# Patient Record
Sex: MALE | Race: WHITE
[De-identification: names, ages, dates, MRNs, and addresses within clinical notes are randomized per-mention and may not be internally consistent; named-entity substitution may affect disease eponyms.]

---

## 2017-05-04 ENCOUNTER — HOSPITAL ENCOUNTER (OUTPATIENT)
Dept: HOSPITAL 84 - D.RT | Age: 82
Discharge: HOME | End: 2017-05-04
Attending: FAMILY MEDICINE
Payer: MEDICARE

## 2017-05-04 VITALS — BODY MASS INDEX: 32.2 KG/M2

## 2017-05-04 DIAGNOSIS — J45.909: Primary | ICD-10-CM

## 2017-09-22 ENCOUNTER — HOSPITAL ENCOUNTER (INPATIENT)
Dept: HOSPITAL 84 - D.ER | Age: 82
LOS: 2 days | Discharge: HOME | DRG: 866 | End: 2017-09-24
Attending: FAMILY MEDICINE | Admitting: FAMILY MEDICINE
Payer: MEDICARE

## 2017-09-22 VITALS — HEIGHT: 70 IN | BODY MASS INDEX: 29.47 KG/M2 | WEIGHT: 205.87 LBS

## 2017-09-22 VITALS — SYSTOLIC BLOOD PRESSURE: 144 MMHG | DIASTOLIC BLOOD PRESSURE: 77 MMHG

## 2017-09-22 DIAGNOSIS — K21.9: ICD-10-CM

## 2017-09-22 DIAGNOSIS — A93.8: Primary | ICD-10-CM

## 2017-09-22 DIAGNOSIS — D69.6: ICD-10-CM

## 2017-09-22 DIAGNOSIS — T36.1X5A: ICD-10-CM

## 2017-09-22 DIAGNOSIS — J40: ICD-10-CM

## 2017-09-22 DIAGNOSIS — I10: ICD-10-CM

## 2017-09-22 DIAGNOSIS — L27.0: ICD-10-CM

## 2017-09-22 LAB
ALBUMIN SERPL-MCNC: 3.3 G/DL (ref 3.4–5)
ALP SERPL-CCNC: 55 U/L (ref 46–116)
ALT SERPL-CCNC: 109 U/L (ref 10–68)
ANION GAP SERPL CALC-SCNC: 16.3 MMOL/L (ref 8–16)
APPEARANCE UR: CLEAR
BACTERIA #/AREA URNS HPF: (no result) /HPF
BILIRUB SERPL-MCNC: 0.43 MG/DL (ref 0.2–1.3)
BILIRUB SERPL-MCNC: NEGATIVE MG/DL
BUN SERPL-MCNC: 24 MG/DL (ref 7–18)
CALCIUM SERPL-MCNC: 8.5 MG/DL (ref 8.5–10.1)
CHLORIDE SERPL-SCNC: 96 MMOL/L (ref 98–107)
CK MB SERPL-MCNC: 10.6 U/L (ref 0–3.6)
CK SERPL-CCNC: 932 UL (ref 21–232)
CO2 SERPL-SCNC: 21.8 MMOL/L (ref 21–32)
COLOR UR: (no result)
CREAT SERPL-MCNC: 1.3 MG/DL (ref 0.6–1.3)
EOSINOPHIL NFR BLD: 2 % (ref 0–7)
ERYTHROCYTE [DISTWIDTH] IN BLOOD BY AUTOMATED COUNT: 13.1 % (ref 11.5–14.5)
GLOBULIN SER-MCNC: 3.6 G/L
GLUCOSE SERPL-MCNC: 117 MG/DL (ref 74–106)
GLUCOSE SERPL-MCNC: NEGATIVE MG/DL
HCT VFR BLD CALC: 38.5 % (ref 42–54)
HGB BLD-MCNC: 13.4 G/DL (ref 13.5–17.5)
KETONES UR STRIP-MCNC: (no result) MG/DL
LEUKOCYTE ESTERASE: NEGATIVE
LIPASE SERPL-CCNC: 97 U/L (ref 73–393)
LYMPHOCYTES NFR BLD AUTO: 21 % (ref 15–50)
MCH RBC QN AUTO: 29.6 PG (ref 26–34)
MCHC RBC AUTO-ENTMCNC: 34.8 G/DL (ref 31–37)
MCV RBC: 85.2 FL (ref 80–100)
MONOCYTES NFR BLD: 2 % (ref 2–11)
NEUTROPHILS NFR BLD AUTO: 75 % (ref 40–80)
NITRITE UR-MCNC: NEGATIVE MG/ML
OSMOLALITY SERPL CALC.SUM OF ELEC: 265 MOSM/KG (ref 275–300)
PH UR STRIP: 6 [PH] (ref 5–6)
PLATELET # BLD EST: (no result) 10*3/UL
PLATELET # BLD: 110 10X3/UL (ref 130–400)
PMV BLD AUTO: 10.4 FL (ref 7.4–10.4)
POTASSIUM SERPL-SCNC: 4.1 MMOL/L (ref 3.5–5.1)
PROT SERPL-MCNC: 6.9 G/DL (ref 6.4–8.2)
PROT UR-MCNC: (no result) MG/DL
RBC # BLD AUTO: 4.52 10X6/UL (ref 4.2–6.1)
RBC #/AREA URNS HPF: (no result) /HPF (ref 0–5)
SODIUM SERPL-SCNC: 130 MMOL/L (ref 136–145)
SP GR UR STRIP: 1.01 (ref 1–1.02)
TROPONIN I SERPL-MCNC: 0.02 NG/ML (ref 0–0.06)
UROBILINOGEN UR-MCNC: NORMAL MG/DL
WBC # BLD AUTO: 2.7 10X3/UL (ref 4.8–10.8)
WBC #/AREA URNS HPF: (no result) /HPF (ref 0–5)

## 2017-09-22 NOTE — DS
PATIENT:TATUM AGUILERA                   :35   MEDICAL RECORD: [id number]
 
                              DISCHARGE SUMMARY
                                                         
ADMISSION DATE:    17                       DISCHARGE DATE:             
 
 
DATE OF ADMISSION:  2017
 
DATE OF DISCHARGE:  2017
 
ADMITTING DIAGNOSES:
1. REACTION TO ROCEPHIN.
2. Possible tick-borne illness.
3. Bronchitis.
 
HOSPITAL COURSE:  This is a gentleman of Dr. Rivera admitted with diagnoses as
outlined above.  Details are well-outlined in the history of the present
illness, H&P.  All events, lab procedures, diagnostic testing are well
documented in the records.  The patient was admitted, started on doxy for
possible tick-borne fever, given steroids for possible rash.  Labs and volume
were closely followed.  Overall, he improved.  He started feeling better.  He is
ambulating.  He was eating.
 
LABORATORY DATA:  In regards to labs, his hematology counts did improve today. 
His white count is 5.2, hemoglobin 11, platelets are 110.  INR is 0.91.  Sodium
138, potassium 3.9, chloride 108, CO2 20.9, BUN 17, serum creatinine 0.9.  His
bilirubin 0.33.  , .  Dr. Calle felt he is stable for dismissal
home.  His blood cultures are no growth so far.  We are holding his cholesterol
medicine just because of the  elevated liver function tests.  We are sending him
home on a total of doxy.  I have given him a hand written script.  He will get a
total of 14-day treatment including what he got here IV at the hospital.  We
want him to see Dr. Rivera this week in the office to follow up on liver
function tests, his CBC and a CMP.
 
DIAGNOSES:
1. REACTION TO ROCEPHIN.
2. Possible tick-borne illness.
3. Bronchitis.
 
Greater than 36 minutes was spent on this discharge.
 
TRANSINT:[id number] Voice Confirmation ID: [id number] DOCUMENT ID: [id number]
 
 
Dictated By: ROOSEVELT NGUYEN RN
I have interviewed/examined the above patient and agree with these documented
findings.
                                           
                                           SULMA CALLE DO            
CC:                                                             [id number]
DICTATION DATE: 17 162     :     17      ADM IN  
                                                                              
[address] [place] [address]

## 2017-09-22 NOTE — HP
PATIENT: TATUM AGUILERA                                 MEDICAL RECORD: [id number]
ACCOUNT: [id number]                                    LOCATION:[facility]          
: 35                                            ADMISSION DATE: 17
                                                         
 
                             HISTORY AND PHYSICAL EXAMINATION
 
 
HISTORY:  Mr. Aguilera is a pleasant 82-year-old white male, patient of Dr. Rivera, that was sent to the Emergency Room from walk-in clinic for suspected
allergic reaction.  Two months ago, he was treated for 14 days for tick illness
and got better.  About a week ago, he started having some bronchitis and upper
respiratory symptoms, has been treated for that recently.  He had a cut on his
leg apparently from a chainsaw or another implement, which appears to be
healing.  He had a shot of Rocephin last night and this morning woke up with the
rash.  He went to the clinic, thought he was having anaphylaxis, and was sent to
the Emergency Room for further evaluation.  He does have an urticarial rash.  He
is not symptomatic really with it.  He has got little bit of itch.  He is not
short of breath.  No wheezing or stridor.  His blood work shows low white count,
low platelets, and elevated transaminases, suspicious for a tick illness.  He is
going to be admitted.  His blood pressure has been a little bit low.  He is
going to be given some fluids and started on IV doxycycline.  We will check tick
titers and give him some steroids for the rash.
 
PAST MEDICAL HISTORY:  Significant for GERD.  He has a history of prostate
cancer.
 
PAST SURGICAL HISTORY:  Previous surgeries include prostatectomy in , lumbar
discectomy, knee replacement, carpal tunnel, and left hip replacement.
 
ALLERGIES:  MORPHINE ONLY.
 
MEDICATIONS:  Omeprazole at home.
 
FAMILY HISTORY:  Noncontributory.
 
SOCIAL HISTORY:  He does not smoke and does not drink.
 
REVIEW OF SYSTEMS:  Significant for recent cough and congestion.  Now, a
pruritic rash on his trunk.  He states he has had a little bit of fever.  He
denies any joint redness or swelling.
 
PHYSICAL EXAMINATION:
GENERAL:  He is in no distress.
HEAD:  Normocephalic.
NECK:  Soft and supple.
HEART:  Regular.
LUNGS:  Fairly clear.
ABDOMEN:  Soft.
EXTREMITIES:  Reveal no edema.
SKIN:  Reveals a maculopapular rash, which appears urticarial.
NEUROLOGIC:  Neck is nonrigid.  There are no meningeal signs.
 
IMPRESSION:
1.  Reaction to Rocephin.
2.  Possible tick-borne illness.
3.  Bronchitis.
 
 
 
 
HISTORY AND PHYSICAL                           [id number]    TATUM AGUILERA         
 
 
PLAN:  Admit, IV fluids, and IV doxy.  List Rocephin as a possible allergy. 
Check tick titer.  See orders for plan.
 
TRANSINT:[id number] Voice Confirmation ID: [id number] DOCUMENT ID: [id number]
 
 
                                           
                                           SULMA CALLE DO            
 
 
 
 
 
 
 
 
 
 
 
 
 
 
 
 
 
 
 
 
 
 
 
 
 
 
 
 
 
 
 
 
 
 
 
 
 
 
 
CC:                                                             [id number]
DICTATION DATE: 17     :     17      [facility]                                          
[address] [place] [address]

## 2017-09-23 VITALS — DIASTOLIC BLOOD PRESSURE: 72 MMHG | SYSTOLIC BLOOD PRESSURE: 128 MMHG

## 2017-09-23 VITALS — DIASTOLIC BLOOD PRESSURE: 76 MMHG | SYSTOLIC BLOOD PRESSURE: 134 MMHG

## 2017-09-23 VITALS — SYSTOLIC BLOOD PRESSURE: 137 MMHG | DIASTOLIC BLOOD PRESSURE: 64 MMHG

## 2017-09-23 LAB
ALBUMIN SERPL-MCNC: 3.1 G/DL (ref 3.4–5)
ALP SERPL-CCNC: 45 U/L (ref 46–116)
ALT SERPL-CCNC: 117 U/L (ref 10–68)
ANION GAP SERPL CALC-SCNC: 12.5 MMOL/L (ref 8–16)
BASOPHILS NFR BLD AUTO: 0 % (ref 0–2)
BILIRUB SERPL-MCNC: 0.4 MG/DL (ref 0.2–1.3)
BUN SERPL-MCNC: 19 MG/DL (ref 7–18)
CALCIUM SERPL-MCNC: 8.2 MG/DL (ref 8.5–10.1)
CHLORIDE SERPL-SCNC: 102 MMOL/L (ref 98–107)
CO2 SERPL-SCNC: 20.5 MMOL/L (ref 21–32)
CREAT SERPL-MCNC: 1 MG/DL (ref 0.6–1.3)
EOSINOPHIL NFR BLD: 0 % (ref 0–7)
ERYTHROCYTE [DISTWIDTH] IN BLOOD BY AUTOMATED COUNT: 13.1 % (ref 11.5–14.5)
GLOBULIN SER-MCNC: 3.3 G/L
GLUCOSE SERPL-MCNC: 196 MG/DL (ref 74–106)
HCT VFR BLD CALC: 35 % (ref 42–54)
HGB BLD-MCNC: 12.2 G/DL (ref 13.5–17.5)
IMM GRANULOCYTES NFR BLD: 0.5 % (ref 0–5)
LYMPHOCYTES NFR BLD AUTO: 27.8 % (ref 15–50)
MCH RBC QN AUTO: 29.2 PG (ref 26–34)
MCHC RBC AUTO-ENTMCNC: 34.9 G/DL (ref 31–37)
MCV RBC: 83.7 FL (ref 80–100)
MONOCYTES NFR BLD: 2.9 % (ref 2–11)
NEUTROPHILS NFR BLD AUTO: 68.8 % (ref 40–80)
OSMOLALITY SERPL CALC.SUM OF ELEC: 269 MOSM/KG (ref 275–300)
PLATELET # BLD: 110 10X3/UL (ref 130–400)
PMV BLD AUTO: 10.2 FL (ref 7.4–10.4)
POTASSIUM SERPL-SCNC: 4 MMOL/L (ref 3.5–5.1)
PROT SERPL-MCNC: 6.4 G/DL (ref 6.4–8.2)
RBC # BLD AUTO: 4.18 10X6/UL (ref 4.2–6.1)
SODIUM SERPL-SCNC: 131 MMOL/L (ref 136–145)
WBC # BLD AUTO: 2.1 10X3/UL (ref 4.8–10.8)

## 2017-09-23 NOTE — NUR
PT ALERT, AWAKE AND ORIENTED. WIFE AT BEDSIDE. PT W/ ALLERGIC DRUG RASH FROM
KNEES UP, MOSTLY CONCENTRATED ON TORSO. SUSPECTED ALLERGEN LIKELY RXN TO
ROCEPHIN PT WITH C/O GENERALIZED ITCHINESS. SODIUM LEVEL 131, INSTRUCTED PT TO
DRINK JUICE INSTEAD OF WATER TODAY. OTHER LABS OF NOTE, WBC 2.1, , PLT
110. CT OF ABD DONE YESTERDAY UNREMARKABLE. D5NS @ 125CC/HR TO RIGHT AC. LAST
TEMP 99, BUT  LAST NIGHT. ON TELE, NSR W/ RATE 81. TX PLAN TO RECEIVE
BENADRYL AND SOLUMEDROL FOR ALLERGIC RXN. PT IS ON RA WITH PATENT AIRWAY.
CURRENTLY NPO. WILL CONTINUE TO MONITOR.

## 2017-09-23 NOTE — NUR
CALL LIGHT ANSWERED, PT INCONTINENT OF URINE. PT TOOK A SHOWER AND BEDDING WAS
CHANGED. PT REPORTS FEELING MUCH BETTER THIS AFTERNOON THAN HE DID THIS
MORNING. THE RASH IS STILL PRESENT, BUT IMPROVING. PT STATES HE IS MUCH LESS
ITCHY AS WELL. PT IS TOLERATING DIET. CONTINUES ON IV BENADRYL, IV SOLUMEDROL,
AND IV DOXYCYCLINE. PT DENIES ANY QUESTIONS, CONCERNS, OR NEEDS AT THIS TIME.
WILL CONT TO MONITOR.

## 2017-09-23 NOTE — NUR
CALLED INTO PATIENT'S ROOM. HE IS RECEIVING NEBULIZER TX. PT IS REQUESTING TO
TURN ON HIS SIDE AFTER HIS BREATHING TX IS COMPLETE. WILL CHECK BACK AND
REPOSITION PATIENT.

## 2017-09-24 VITALS — DIASTOLIC BLOOD PRESSURE: 70 MMHG | SYSTOLIC BLOOD PRESSURE: 132 MMHG

## 2017-09-24 VITALS — SYSTOLIC BLOOD PRESSURE: 134 MMHG | DIASTOLIC BLOOD PRESSURE: 71 MMHG

## 2017-09-24 VITALS — DIASTOLIC BLOOD PRESSURE: 86 MMHG | SYSTOLIC BLOOD PRESSURE: 142 MMHG

## 2017-09-24 VITALS — DIASTOLIC BLOOD PRESSURE: 71 MMHG | SYSTOLIC BLOOD PRESSURE: 129 MMHG

## 2017-09-24 VITALS — DIASTOLIC BLOOD PRESSURE: 76 MMHG | SYSTOLIC BLOOD PRESSURE: 133 MMHG

## 2017-09-24 LAB
ALBUMIN SERPL-MCNC: 2.8 G/DL (ref 3.4–5)
ALP SERPL-CCNC: 48 U/L (ref 46–116)
ALT SERPL-CCNC: 154 U/L (ref 10–68)
ANION GAP SERPL CALC-SCNC: 13 MMOL/L (ref 8–16)
BASOPHILS NFR BLD AUTO: 0.2 % (ref 0–2)
BILIRUB SERPL-MCNC: 0.33 MG/DL (ref 0.2–1.3)
BUN SERPL-MCNC: 17 MG/DL (ref 7–18)
CALCIUM SERPL-MCNC: 8.2 MG/DL (ref 8.5–10.1)
CHLORIDE SERPL-SCNC: 108 MMOL/L (ref 98–107)
CO2 SERPL-SCNC: 20.9 MMOL/L (ref 21–32)
CREAT SERPL-MCNC: 0.9 MG/DL (ref 0.6–1.3)
EOSINOPHIL NFR BLD: 0 % (ref 0–7)
ERYTHROCYTE [DISTWIDTH] IN BLOOD BY AUTOMATED COUNT: 13.7 % (ref 11.5–14.5)
GLOBULIN SER-MCNC: 3.4 G/L
GLUCOSE SERPL-MCNC: 169 MG/DL (ref 74–106)
HCT VFR BLD CALC: 33.2 % (ref 42–54)
HGB BLD-MCNC: 11.4 G/DL (ref 13.5–17.5)
IMM GRANULOCYTES NFR BLD: 0.2 % (ref 0–5)
INR PPP: 0.91 (ref 0.85–1.17)
LYMPHOCYTES NFR BLD AUTO: 19.2 % (ref 15–50)
MCH RBC QN AUTO: 29.3 PG (ref 26–34)
MCHC RBC AUTO-ENTMCNC: 34.3 G/DL (ref 31–37)
MCV RBC: 85.3 FL (ref 80–100)
MONOCYTES NFR BLD: 7.5 % (ref 2–11)
NEUTROPHILS NFR BLD AUTO: 72.9 % (ref 40–80)
OSMOLALITY SERPL CALC.SUM OF ELEC: 281 MOSM/KG (ref 275–300)
PLATELET # BLD: 110 10X3/UL (ref 130–400)
PMV BLD AUTO: 10.5 FL (ref 7.4–10.4)
POTASSIUM SERPL-SCNC: 3.9 MMOL/L (ref 3.5–5.1)
PROT SERPL-MCNC: 6.2 G/DL (ref 6.4–8.2)
PROTHROMBIN TIME: 12.1 SECONDS (ref 11.6–15)
RBC # BLD AUTO: 3.89 10X6/UL (ref 4.2–6.1)
SODIUM SERPL-SCNC: 138 MMOL/L (ref 136–145)
WBC # BLD AUTO: 5.2 10X3/UL (ref 4.8–10.8)

## 2017-09-24 NOTE — NUR
IV ABT COMPLETE. IV REMOVED FROM RIGHT A/C. PT IS ALERT/ORIENTED AND READY TO
GO HOME. DISCHARGE PAPERWORK BEING COMPLETED.

## 2017-09-24 NOTE — NUR
REVIEWED DISCHARGE PAPERWORK WITH PATIENT/WIFE. WIFE HAS ALREADY TAKEN
PRESCRIPTION AND HAD IT FILLED. PT HAS NO QUESTION AND IS PLEASANT AND HAPPY
TO BE GOING HOME.

## 2017-09-24 NOTE — NUR
AM ROUNDS - PT IN BED AND APPEARS TO BE SLEEPING AT THIS TIME.  NO YELLOW BAND
ON.  IV TO RIGHT AC, D5NS @125CC/HR.  WIFE AT BEDSIDE.  MONITOR SR, HR 64.
WILL CONTINUE TO MONITOR

## 2017-09-24 NOTE — NUR
22 GAUGE IV PLACED TO RIGHT WRIST X 1 STICK. GOOD BLOOD RETURN, EASY FLUSH.
TAPED, DATED AND SECURED. TOLERATED IV PLACEMENT WELL. NO DISTRESS.

## 2017-09-24 NOTE — NUR
PT DISCHARGED TO HOME VIA WHEELCHAIR TO PRIVATE CAR. ALL BELONGINGS ACCOUNTED
FOR AND WITH PT AND HIS WIFE.

## 2018-05-11 ENCOUNTER — HOSPITAL ENCOUNTER (OUTPATIENT)
Dept: HOSPITAL 84 - D.LAB | Age: 83
Discharge: HOME | End: 2018-05-11
Attending: INTERNAL MEDICINE
Payer: MEDICARE

## 2018-05-11 VITALS — BODY MASS INDEX: 29.5 KG/M2

## 2018-05-11 DIAGNOSIS — J30.9: ICD-10-CM

## 2018-05-11 DIAGNOSIS — R93.8: ICD-10-CM

## 2018-05-11 DIAGNOSIS — J68.3: Primary | ICD-10-CM

## 2018-05-12 LAB — IGA SERPL-MCNC: 166 MG/DL (ref 61–437)

## 2018-05-14 LAB
IGG1 SER-MCNC: 747 MG/DL (ref 248–810)
IGG2 SER-MCNC: 335 MG/DL (ref 130–555)
IGG3 SER-MCNC: 48 MG/DL (ref 15–102)
IGG4 SER-MCNC: 46 MG/DL (ref 2–96)

## 2018-05-15 LAB — C IMMITIS AB TITR SER ID: (no result) {TITER}

## 2019-01-17 ENCOUNTER — HOSPITAL ENCOUNTER (OUTPATIENT)
Dept: HOSPITAL 84 - D.CT | Age: 84
Discharge: HOME | End: 2019-01-17
Attending: INTERNAL MEDICINE
Payer: MEDICARE

## 2019-01-17 VITALS — BODY MASS INDEX: 29.5 KG/M2

## 2019-01-17 DIAGNOSIS — R93.89: Primary | ICD-10-CM

## 2021-05-12 ENCOUNTER — HOSPITAL ENCOUNTER (OUTPATIENT)
Dept: HOSPITAL 84 - D.RT | Age: 86
Discharge: HOME | End: 2021-05-12
Attending: INTERNAL MEDICINE
Payer: MEDICARE

## 2021-05-12 VITALS — BODY MASS INDEX: 29.5 KG/M2

## 2021-05-12 DIAGNOSIS — Z11.52: ICD-10-CM

## 2021-05-12 DIAGNOSIS — R91.1: Primary | ICD-10-CM
